# Patient Record
Sex: MALE | Race: WHITE | URBAN - METROPOLITAN AREA
[De-identification: names, ages, dates, MRNs, and addresses within clinical notes are randomized per-mention and may not be internally consistent; named-entity substitution may affect disease eponyms.]

---

## 2022-03-08 ENCOUNTER — OFFICE VISIT (OUTPATIENT)
Dept: URBAN - METROPOLITAN AREA CLINIC 58 | Facility: CLINIC | Age: 27
End: 2022-03-08
Payer: COMMERCIAL

## 2022-03-08 DIAGNOSIS — G43.909 MIGRAINE: Primary | ICD-10-CM

## 2022-03-08 PROCEDURE — 92004 COMPRE OPH EXAM NEW PT 1/>: CPT | Performed by: OPTOMETRIST

## 2022-03-08 ASSESSMENT — INTRAOCULAR PRESSURE
OD: 12
OS: 14

## 2022-03-08 ASSESSMENT — VISUAL ACUITY
OD: 20/20
OS: 20/20

## 2022-03-08 NOTE — IMPRESSION/PLAN
Impression: Migraine: G43.909. Plan: Discussed diagnosis in detail with patient. Most likely due to previous Meningitis. No ocular damage seen. Recommend to continue under the care of Neurologist. Call if worsening symptoms.